# Patient Record
(demographics unavailable — no encounter records)

---

## 2025-01-22 NOTE — ASSESSMENT
[FreeTextEntry1] : 53 yo female with left hydronephrosis with possible left ureteral filling defect noted on CT.  I discussed the need to proceed with diagnostic ureteroscopy which may require a ureteral biopsy.  Additional treatment will depend on what is found at the time of ureteroscopy.  We reviewed the risks and benefits of URS.  We also discussed the likely need for  a ureteral stent post procedure.  She wishes to proceed with surgery.   Plan: 1. Schedule diagnostic left URS 2. PST 3. Medical clearance

## 2025-01-22 NOTE — HISTORY OF PRESENT ILLNESS
[FreeTextEntry1] : 53 yo female referred for incidental finding of left hydronephrosis with possible left mid-ureteral filling defect.  A CT scan was obtained to determine the cause of LLQ discomfort. She denies any associated LUTS, hematuria, or dysuria.  She has a prior history of cervical cancer and is s/p Chemo and radiation which was completed last year. She is currently on pembrolizumab as part of her treatment course.